# Patient Record
Sex: MALE | Race: WHITE | URBAN - METROPOLITAN AREA
[De-identification: names, ages, dates, MRNs, and addresses within clinical notes are randomized per-mention and may not be internally consistent; named-entity substitution may affect disease eponyms.]

---

## 2023-03-07 ENCOUNTER — OFFICE VISIT (OUTPATIENT)
Dept: URBAN - METROPOLITAN AREA CLINIC 51 | Facility: CLINIC | Age: 68
End: 2023-03-07
Payer: MEDICARE

## 2023-03-07 DIAGNOSIS — H25.813 COMBINED FORMS OF AGE-RELATED CATARACT, BILATERAL: ICD-10-CM

## 2023-03-07 DIAGNOSIS — E11.9 TYPE 2 DIABETES MELLITUS W/O COMPLICATION: Primary | ICD-10-CM

## 2023-03-07 DIAGNOSIS — H43.813 VITREOUS DEGENERATION, BILATERAL: ICD-10-CM

## 2023-03-07 DIAGNOSIS — Z79.2 LONG TERM (CURRENT) USE OF ANTIBIOTICS: ICD-10-CM

## 2023-03-07 PROCEDURE — 92134 CPTRZ OPH DX IMG PST SGM RTA: CPT

## 2023-03-07 PROCEDURE — 92250 FUNDUS PHOTOGRAPHY W/I&R: CPT

## 2023-03-07 PROCEDURE — 99204 OFFICE O/P NEW MOD 45 MIN: CPT

## 2023-03-07 ASSESSMENT — VISUAL ACUITY
OS: 20/25
OD: 20/25

## 2023-03-07 ASSESSMENT — INTRAOCULAR PRESSURE
OS: 20
OD: 20

## 2023-03-07 ASSESSMENT — KERATOMETRY
OD: 44.00
OS: 44.75

## 2023-03-07 NOTE — IMPRESSION/PLAN
Impression: Type 2 diabetes mellitus w/o complication: P18.1. Plan: Patient educated on condition. Informed that DM is the #1 form of preventable blindness in the [address] [facility] [address]. Continue strict blood sugar control with PCP. Monitor yearly. Last A1C unknown Diagnosed 2021

## 2023-03-07 NOTE — IMPRESSION/PLAN
Impression: Vitreous degeneration, bilateral: H43.813. Plan: Pt educated on condition. Informed of s/s of RD such as floaters, flashes, or curtain/veil over vision to RTC immediately.  Monitor